# Patient Record
Sex: FEMALE | Race: WHITE | Employment: OTHER | URBAN - METROPOLITAN AREA
[De-identification: names, ages, dates, MRNs, and addresses within clinical notes are randomized per-mention and may not be internally consistent; named-entity substitution may affect disease eponyms.]

---

## 2019-09-13 ENCOUNTER — HOSPITAL ENCOUNTER (OUTPATIENT)
Dept: MAMMOGRAPHY | Age: 64
Discharge: HOME | End: 2019-09-13
Attending: OBSTETRICS & GYNECOLOGY
Payer: COMMERCIAL

## 2019-09-13 DIAGNOSIS — N63.20 LEFT BREAST MASS: ICD-10-CM

## 2019-09-13 PROCEDURE — 77066 DX MAMMO INCL CAD BI: CPT

## 2019-09-13 PROCEDURE — 76642 ULTRASOUND BREAST LIMITED: CPT

## 2024-08-21 ENCOUNTER — OFFICE VISIT (OUTPATIENT)
Dept: OBGYN CLINIC | Age: 69
End: 2024-08-21
Payer: COMMERCIAL

## 2024-08-21 VITALS — BODY MASS INDEX: 28.2 KG/M2 | DIASTOLIC BLOOD PRESSURE: 60 MMHG | SYSTOLIC BLOOD PRESSURE: 90 MMHG | WEIGHT: 163 LBS

## 2024-08-21 DIAGNOSIS — R10.2 PELVIC PRESSURE IN FEMALE: ICD-10-CM

## 2024-08-21 DIAGNOSIS — R10.2 PELVIC PAIN: Primary | ICD-10-CM

## 2024-08-21 PROCEDURE — 1123F ACP DISCUSS/DSCN MKR DOCD: CPT | Performed by: OBSTETRICS & GYNECOLOGY

## 2024-08-21 PROCEDURE — 99213 OFFICE O/P EST LOW 20 MIN: CPT | Performed by: OBSTETRICS & GYNECOLOGY

## 2024-08-21 ASSESSMENT — ENCOUNTER SYMPTOMS
EYES NEGATIVE: 1
ALLERGIC/IMMUNOLOGIC NEGATIVE: 1
GASTROINTESTINAL NEGATIVE: 1
RESPIRATORY NEGATIVE: 1

## 2024-08-21 NOTE — PROGRESS NOTES
Name: Kamryn Koroma  Date: 2024  YOB: 1955  LMP: No LMP recorded.      Kamryn is a 68 y.o.   who is here for a problem visit for pelic pain  and feeling pressure in her vagina.  She has seen the urogyn at [facility] and did not like the pessary.    Kamryn  has no history on file for sexual activity.  Contraception: none.   Menstrual status: none  Gyn Surgery: s/p cystectomy     Women's Health Timeline:  No specialty comments available.      Pap History:  Diagnosis:   Date Value Ref Range Status   2022 Comment  Final     Comment:     NEGATIVE FOR INTRAEPITHELIAL LESION OR MALIGNANCY.  CELLULAR CHANGES ASSOCIATED WITH ATROPHY ARE PRESENT.     2020 Comment  Final     Comment:     NEGATIVE FOR INTRAEPITHELIAL LESION OR MALIGNANCY.   08/15/2018 Comment  Final     Comment:     NEGATIVE FOR INTRAEPITHELIAL LESION AND MALIGNANCY.        OB History:  OB History          3    Para        Term   3            AB        Living             SAB        IAB        Ectopic        Molar        Multiple        Live Births                     Medical History:  Past Medical History:  No date: Abnormal Papanicolaou smear of cervix  No date: Bipolar disorder ([facility])  No date: Chronic knee pain  No date: Cystocele  No date: Diabetic peripheral neuropathy associated with type 2   diabetes mellitus ([facility])  No date: Dyslipidemia  No date: Gastroesophageal reflux disease  No date: Genital herpes  No date: Hypertension  No date: Irritable bowel syndrome  No date: Rectocele     Surgical History:  Past Surgical History:  No date: ABDOMINOPLASTY  : BREAST BIOPSY      Comment:  benign per pt  No date:  SECTION      Comment:  x 1  No date: HX CYSTECTOMY      Comment:  right foot/nail  1998: LEEP  No date: TONSILLECTOMY     Meds:    Current Outpatient Medications:     acetaminophen (TYLENOL) 500 MG tablet, Take by mouth every 6 hours as needed, Disp: , Rfl:     albuterol sulfate  (90 Base)

## 2024-08-29 ENCOUNTER — OFFICE VISIT (OUTPATIENT)
Dept: OBGYN CLINIC | Age: 69
End: 2024-08-29
Payer: COMMERCIAL

## 2024-08-29 VITALS
BODY MASS INDEX: 28 KG/M2 | SYSTOLIC BLOOD PRESSURE: 114 MMHG | DIASTOLIC BLOOD PRESSURE: 72 MMHG | HEIGHT: 64 IN | WEIGHT: 164 LBS

## 2024-08-29 DIAGNOSIS — R10.32 LLQ PAIN: Primary | ICD-10-CM

## 2024-08-29 PROCEDURE — 99213 OFFICE O/P EST LOW 20 MIN: CPT | Performed by: OBSTETRICS & GYNECOLOGY

## 2024-08-29 PROCEDURE — 1123F ACP DISCUSS/DSCN MKR DOCD: CPT | Performed by: OBSTETRICS & GYNECOLOGY

## 2024-08-29 SDOH — ECONOMIC STABILITY: INCOME INSECURITY: HOW HARD IS IT FOR YOU TO PAY FOR THE VERY BASICS LIKE FOOD, HOUSING, MEDICAL CARE, AND HEATING?: NOT HARD AT ALL

## 2024-08-29 SDOH — ECONOMIC STABILITY: FOOD INSECURITY: WITHIN THE PAST 12 MONTHS, YOU WORRIED THAT YOUR FOOD WOULD RUN OUT BEFORE YOU GOT MONEY TO BUY MORE.: NEVER TRUE

## 2024-08-29 SDOH — ECONOMIC STABILITY: FOOD INSECURITY: WITHIN THE PAST 12 MONTHS, THE FOOD YOU BOUGHT JUST DIDN'T LAST AND YOU DIDN'T HAVE MONEY TO GET MORE.: NEVER TRUE

## 2024-08-29 ASSESSMENT — ENCOUNTER SYMPTOMS
ALLERGIC/IMMUNOLOGIC NEGATIVE: 1
EYES NEGATIVE: 1
RESPIRATORY NEGATIVE: 1
GASTROINTESTINAL NEGATIVE: 1

## 2024-08-29 NOTE — PROGRESS NOTES
Name: Kamryn Koroma  Date: 2024  YOB: 1955  LMP: No LMP recorded.      Kamryn is a 68 y.o.   who is here for a us due to LLQ pain.  She has a appt scheduled with Daya in several weeks for prolapse.     Kamryn  reports that she is not currently sexually active.  Contraception: post menopausal status.   Menstrual status: none  Gyn Surgery: s/p cystectomy     Women's Health Timeline:  No specialty comments available.      Pap History:  Diagnosis:   Date Value Ref Range Status   2022 Comment  Final     Comment:     NEGATIVE FOR INTRAEPITHELIAL LESION OR MALIGNANCY.  CELLULAR CHANGES ASSOCIATED WITH ATROPHY ARE PRESENT.     2020 Comment  Final     Comment:     NEGATIVE FOR INTRAEPITHELIAL LESION OR MALIGNANCY.   08/15/2018 Comment  Final     Comment:     NEGATIVE FOR INTRAEPITHELIAL LESION AND MALIGNANCY.        OB History:  OB History          3    Para        Term   3            AB        Living             SAB        IAB        Ectopic        Molar        Multiple        Live Births                     Medical History:  Past Medical History:  No date: Abnormal Papanicolaou smear of cervix  No date: Bipolar disorder ([facility])  No date: Chronic knee pain  No date: Cystocele  No date: Diabetic peripheral neuropathy associated with type 2   diabetes mellitus ([facility])  No date: Dyslipidemia  No date: Gastroesophageal reflux disease  No date: Genital herpes  No date: Hypertension  No date: Irritable bowel syndrome  No date: Rectocele     Surgical History:  Past Surgical History:  No date: ABDOMINOPLASTY  : BREAST BIOPSY      Comment:  benign per pt  No date:  SECTION      Comment:  x 1  No date: HX CYSTECTOMY      Comment:  right foot/nail  1998: LEEP  No date: TONSILLECTOMY     Meds:    Current Outpatient Medications:     acetaminophen (TYLENOL) 500 MG tablet, Take by mouth every 6 hours as needed, Disp: , Rfl:     albuterol sulfate  (90 Base) MCG/ACT inhaler,

## 2024-10-03 ENCOUNTER — OFFICE VISIT (OUTPATIENT)
Dept: UROGYNECOLOGY | Age: 69
End: 2024-10-03
Payer: COMMERCIAL

## 2024-10-03 VITALS — WEIGHT: 160 LBS | BODY MASS INDEX: 28.35 KG/M2 | HEIGHT: 63 IN

## 2024-10-03 DIAGNOSIS — M62.89 HIGH-TONE PELVIC FLOOR DYSFUNCTION: Primary | ICD-10-CM

## 2024-10-03 LAB
BILIRUBIN, URINE, POC: NEGATIVE
BLOOD URINE, POC: NEGATIVE
GLUCOSE URINE, POC: 100
KETONES, URINE, POC: NEGATIVE
LEUKOCYTE ESTERASE, URINE, POC: NEGATIVE
NITRITE, URINE, POC: NEGATIVE
PH, URINE, POC: 5.5 (ref 4.6–8)
PROTEIN,URINE, POC: NEGATIVE
SPECIFIC GRAVITY, URINE, POC: 1 (ref 1–1.03)
URINALYSIS CLARITY, POC: CLEAR
URINALYSIS COLOR, POC: YELLOW
UROBILINOGEN, POC: NORMAL MG/DL

## 2024-10-03 PROCEDURE — 81002 URINALYSIS NONAUTO W/O SCOPE: CPT | Performed by: OBSTETRICS & GYNECOLOGY

## 2024-10-03 PROCEDURE — 1123F ACP DISCUSS/DSCN MKR DOCD: CPT | Performed by: OBSTETRICS & GYNECOLOGY

## 2024-10-03 PROCEDURE — 99204 OFFICE O/P NEW MOD 45 MIN: CPT | Performed by: OBSTETRICS & GYNECOLOGY

## 2024-10-03 PROCEDURE — 99459 PELVIC EXAMINATION: CPT | Performed by: OBSTETRICS & GYNECOLOGY

## 2024-10-03 NOTE — ASSESSMENT & PLAN NOTE
High tone pelvic floor- she has tenderness throughout. Left > Right. She has a lot of weakness of the muscles as well.     We discussed the purpose of physical therapy which is to strengthen the pelvic floor muscles and teach proper coordination of those muscles. I described the anatomy of those muscles involved and their relationship to the end-organs in the pelvis. I described therapy techniques which include a combination of therapeutic exercise, biofeedback, neuromuscular re-education, home programs, and electrical stimulation, as well as therapeutic massage and ultrasound for pain.    I recommend Physical therapy and will send a referral.

## 2024-10-03 NOTE — PATIENT INSTRUCTIONS
Pelvic Floor Therapy List:    Locations within [facility]    Scheduling phone number: [phone]    [facility]       [address]  [place] [address]    [facility]  [address]  [place] [address]    [facility]  [address]  [place] [address]    [facility]  [address] [address]      Locations Outside of [facility]    [facility]- (Janeen Guidry & Summer Ramirez)  [address]  [place] [address]  Phone: [phone]  Fax [phone]    [facility] -(Rowan Duncan)  [address] [address]  Phone: [phone]  Fax: [phone]    [facility]  [address] [place] [address]  Phone: [phone]  Fax: [phone]    [facility] (Audrey Turner)  [address]  [place] [address]  Phone: [phone]  Fax: [phone]    [facility] (Kate Nuñez)  [address] [address]  Phone: [phone]  Fax: [phone]    [facility] (Dr. Ashley Coronado and Dr. Audrey Pineda)  [address] [place] [address]   Phone: [phone]  Fax: [phone]  &  [address] [address]    [facility] (Kenisha Rodrigues)  [address]  [place] [address]  Phone: [phone]  Fax: [phone]    [facility] [place]- (Sandrine Waldron)  [address]  [facility] [address]  Phone: [phone]  Fax: [phone]    [facility] (Evi Stokes)  [address] [place] [address]  [phone]  Fax [phone]  [facility]- (Alex Capps)  [address]   [place] [address]  Phone: [phone]  Fax: [phone]    [facility] (Summer De Jesus)  [address]  [place] [address]  Phone: [phone]  Fax: [phone]

## 2024-10-03 NOTE — PROGRESS NOTES
were reviewed from prior tests:     No results found for this visit on 10/03/24.    Ht 1.6 m (5' 3\")   Wt 72.6 kg (160 lb)   BMI 28.34 kg/m²     Physical Exam  Vitals reviewed. Exam conducted with a chaperone present.   Constitutional:       General: She is not in acute distress.     Appearance: Normal appearance.   HENT:      Head: Normocephalic and atraumatic.   Cardiovascular:      Heart sounds: Normal heart sounds.   Pulmonary:      Effort: Pulmonary effort is normal. No respiratory distress.   Abdominal:      General: There is no distension.      Palpations: There is no mass.      Tenderness: There is no abdominal tenderness. There is no guarding or rebound.      Hernia: No hernia is present.   Musculoskeletal:      Cervical back: Normal range of motion.   Skin:     General: Skin is warm and dry.   Neurological:      Mental Status: She is alert and oriented to person, place, and time.   Psychiatric:         Mood and Affect: Mood normal.         Behavior: Behavior normal.         Thought Content: Thought content normal.         Judgment: Judgment normal.          Female Genitourinary This includes at least 4 minutes of clinical staff time associated with chaperoning a pelvic exam.  Vulva:    Normal. No lesions  Bartholin's Gland:  Bilateral , Normal, nontender  Skenes Gland:  Bilateral, Normal, nontender   Clitoris:  Normal.   Introitus:    Normal.   Urethral Meatus:  Normal appearing, normal size, no lesions, no prolapse  Urethra:  No masses, no tenderness  Vagina:  No atrophy, no discharge, no lesions  Cervix:  No lesions, no discharge  Uterus:  No tenderness, normal mobility   Adnexa:   No masses palpated, no tenderness  Bladder:  No tenderness, no masses palpated  Perineum:  Normal, no lesions    Rectal   Anorectal Exam: No hemorrhoids and no masses or lesions of the perineum    POP-Q: (Pelvic Organ Prolapse - Quantification Exam):      10/3/2024     1:00 PM   Pelvic Organ Prolapse Quantification

## 2024-10-06 LAB
BACTERIA SPEC CULT: NORMAL
SERVICE CMNT-IMP: NORMAL

## 2024-10-14 ENCOUNTER — PREP FOR PROCEDURE (OUTPATIENT)
Age: 69
End: 2024-10-14

## 2024-10-14 ENCOUNTER — OFFICE VISIT (OUTPATIENT)
Age: 69
End: 2024-10-14
Payer: COMMERCIAL

## 2024-10-14 ENCOUNTER — TELEPHONE (OUTPATIENT)
Age: 69
End: 2024-10-14

## 2024-10-14 VITALS
OXYGEN SATURATION: 98 % | HEIGHT: 63 IN | HEART RATE: 87 BPM | WEIGHT: 162 LBS | DIASTOLIC BLOOD PRESSURE: 58 MMHG | SYSTOLIC BLOOD PRESSURE: 101 MMHG | BODY MASS INDEX: 28.7 KG/M2 | RESPIRATION RATE: 16 BRPM

## 2024-10-14 DIAGNOSIS — R19.8 ALTERNATING CONSTIPATION AND DIARRHEA: ICD-10-CM

## 2024-10-14 DIAGNOSIS — R10.32 LEFT LOWER QUADRANT PAIN: Primary | ICD-10-CM

## 2024-10-14 DIAGNOSIS — R10.32 LEFT LOWER QUADRANT PAIN: ICD-10-CM

## 2024-10-14 PROCEDURE — 1123F ACP DISCUSS/DSCN MKR DOCD: CPT | Performed by: PHYSICIAN ASSISTANT

## 2024-10-14 PROCEDURE — 99204 OFFICE O/P NEW MOD 45 MIN: CPT | Performed by: PHYSICIAN ASSISTANT

## 2024-10-14 RX ORDER — DICYCLOMINE HYDROCHLORIDE 10 MG/1
10 CAPSULE ORAL 2 TIMES DAILY
Qty: 180 CAPSULE | Refills: 0 | Status: SHIPPED | OUTPATIENT
Start: 2024-10-14

## 2024-10-14 NOTE — TELEPHONE ENCOUNTER
Patient in office, scheduled for colonoscopy with Dr. Barajas 10/22/2024 at [facility]. Miralax prep instructions given to the patient in office. Faxed cardiac clearance and Plavix hold request to Dr. Davenport/[facility] [phone].         Items to purchase at pharmacy 5 days before your procedure:  Dulcolax tablets-- (LAXATIVE ONLY- not stool softener)  238-gram bottle of MiraLAX  64 ounces Gatorade, Crystal Light, Apple juice (NO RED BLUE OR PURPLE IN COLOR)    The day before your procedure:  Clear liquids only the entire day (water, coffee, tea, Sprite, 7-up, Jell-O, popsicles, Chicken/beef broth, apple juice, Gatorade) NO MILK, CREAMER, OR DAIRY PRODUCTS. NO SOLID FOODS. NOTHING RED, BLUE, OR PURPLE IN COLOR.  Mix Gatorade and MiraLAX together, shake the solution until the MiraLAX is dissolved. Chill before drinking.   Start at 5:00 pm Start drinking MiraLAX solution. Drink 8-ounce glass every 30 minutes. Until half gone. Put remainder in refrigerator.  At 5:00 pm take 2x Dulcolax tablets with 16 oz of water    NO MORE LIQUIDS AFTER MIDNIGHT (other than 2nd part of MiraLAX solution)    The day of your procedure:     6 HOURS PRIOR TO PROCEDURE: Drink the other half of your MiraLAX solution until gone.     Once you finish your prep, no more liquids until after procedure.     IMPORTANT:     If you do not follow these instructions, your colonoscopy could be canceled due to having an unclean prep.

## 2024-10-14 NOTE — PROGRESS NOTES
valACYclovir (VALTREX) 1 g tablet Take 1 tablet by mouth 2 times daily as needed      carvedilol (COREG) 3.125 MG tablet Take 1 tablet by mouth 2 times daily      naproxen (NAPROSYN) 500 MG tablet TAKE 1 TABLET BY MOUTH TWICE DAILY AS NEEDED (Patient not taking: Reported on 10/3/2024)       No current facility-administered medications for this visit.       Review of Systems  All other systems reviewed and are negative except as noted above.          Objective     Vitals:    10/14/24 1104   BP: (!) 101/58   Pulse: 87   Resp: 16   SpO2: 98%       Physical Exam  Vitals reviewed.   Constitutional:       General: She is not in acute distress.     Appearance: She is not ill-appearing, toxic-appearing or diaphoretic.   Eyes:      General: No scleral icterus.  Cardiovascular:      Rate and Rhythm: Normal rate and regular rhythm.      Heart sounds: No murmur heard.  Pulmonary:      Effort: Pulmonary effort is normal. No respiratory distress.   Abdominal:      General: There is no distension.      Palpations: Abdomen is soft.      Tenderness: There is abdominal tenderness (mild discomfort across the lower abdomen). There is no guarding or rebound.      Comments: Protuberant abdomen   Skin:     General: Skin is warm and dry.      Coloration: Skin is not jaundiced.   Neurological:      General: No focal deficit present.      Mental Status: She is alert and oriented to person, place, and time.   Psychiatric:         Mood and Affect: Mood normal.         Behavior: Behavior normal.         Thought Content: Thought content normal.         Judgment: Judgment normal.              Return for scheduled colonoscopy, follow-up visit 1-2 weeks after procedure.            An electronic signature was used to authenticate this note.    --Melissa R Grinnell, PA-C

## 2024-10-14 NOTE — PATIENT INSTRUCTIONS
For diarrhea:   Take Imodium as needed for diarrhea. If you are frequently having urgent diarrhea following meals, schedule Imodium dose 30 minutes prior to meals. Be sure to stay well hydrated. Avoid alcohol as well as food and beverages high in fat, sugar, artificial sweeteners or stimulants such as nicotine or caffeine. Increase daily fiber intake to 25-35 grams daily either by dietary intake or an over-the-counter psyllium husk fiber supplement. Keep a diet journal to evaluate for any food triggers. Can try OTC IB-ross (encapsulated peppermint oil) for abdominal pain or discomfort related to IBS.

## 2024-10-15 RX ORDER — SODIUM CHLORIDE 0.9 % (FLUSH) 0.9 %
5-40 SYRINGE (ML) INJECTION EVERY 12 HOURS SCHEDULED
Status: CANCELLED | OUTPATIENT
Start: 2024-10-15

## 2024-10-15 RX ORDER — SODIUM CHLORIDE 9 MG/ML
25 INJECTION, SOLUTION INTRAVENOUS PRN
Status: CANCELLED | OUTPATIENT
Start: 2024-10-15

## 2024-10-15 RX ORDER — SODIUM CHLORIDE 0.9 % (FLUSH) 0.9 %
5-40 SYRINGE (ML) INJECTION PRN
Status: CANCELLED | OUTPATIENT
Start: 2024-10-15

## 2024-10-16 RX ORDER — LAMOTRIGINE 25 MG/1
25 TABLET ORAL DAILY
COMMUNITY

## 2024-10-16 RX ORDER — ZIPRASIDONE HYDROCHLORIDE 20 MG/1
20 CAPSULE ORAL 2 TIMES DAILY WITH MEALS
COMMUNITY

## 2024-10-16 RX ORDER — POTASSIUM CHLORIDE 750 MG/1
40 TABLET, EXTENDED RELEASE ORAL 2 TIMES DAILY
COMMUNITY

## 2024-10-16 RX ORDER — BENZTROPINE MESYLATE 1 MG/1
2 TABLET ORAL 2 TIMES DAILY
COMMUNITY

## 2024-10-16 RX ORDER — NITROGLYCERIN 0.4 MG/1
0.4 TABLET SUBLINGUAL EVERY 5 MIN PRN
COMMUNITY

## 2024-10-16 RX ORDER — CHLORHEXIDINE GLUCONATE 4 %
1 LIQUID (ML) TOPICAL NIGHTLY
COMMUNITY

## 2024-10-16 RX ORDER — CLOPIDOGREL BISULFATE 75 MG/1
75 TABLET ORAL DAILY
COMMUNITY

## 2024-10-16 RX ORDER — FLUTICASONE PROPIONATE 50 MCG
1 SPRAY, SUSPENSION (ML) NASAL DAILY PRN
COMMUNITY

## 2024-10-16 NOTE — PERIOP NOTE
Patient verified name, , and procedure.    Type: 1a; abbreviated assessment per anesthesia guidelines    Labs per anesthesia: None    Instructed pt that they will be notified the day before their procedure by the GI Lab for time of arrival if their procedure is [place] and Pre-op for [place] cases. Arrival times should be called by 5 pm. If no phone is received the patient should contact their respective hospital. The GI lab telephone number is [phone] and ES Pre-op is [phone].     Follow diet and prep instructions per office including NPO status with exception of Please drink 32 ounces of non-caffeinated clear liquids 2 hours prior to your arrival to avoid dehydration.       Bath or shower the night before and the am of surgery with non-moisturizing soap. No lotions, oils, powders, cologne on skin. No make up, eye make up or jewelry. Wear loose fitting comfortable, clean clothing.     Must have adult present in building the entire time .     Medications for the day of procedure amlodipine, benztropine, carvedilol, gabapentin, lamotrigine, Omeprazole, Ziprasidone and night prior to procedure Toujeo 46 units.     Please hold all vitamins x 7 days prior to procedure and NSAIDS (Aspirin, Excedrin, Goody powders, Motrin, Ibuprofen, Advil, Aleve and Naproxen) x 5 days prior to procedure. Should you have a procedure date that does not allow for the amount of time instructed above, please stop taking vitamins, supplements, and NSAIDS IMMEDIATELY.        The following discharge instructions reviewed with patient: medication given during procedure may cause drowsiness for several hours, therefore, do not drive or operate machinery for remainder of the day. You may not drink alcohol on the day of your procedure, please resume regular diet and activity unless otherwise directed. You may experience abdominal distention for several hours that is relieved by the passage of gas. Contact your physician if you have any of the

## 2024-10-21 NOTE — PROGRESS NOTES
RN called pt to inform them of an opening and an earlier appointment.  Appointment time of 1200 with arrival at 1030.  Pt verbalized understanding.

## 2024-10-21 NOTE — PROGRESS NOTES
RN called Pt to confirm appointment time of 1330, arrival time 1200, location,  requirement, and instructions for registration at the hospital.  Pt verbalized understanding.

## 2024-10-22 ENCOUNTER — ANESTHESIA (OUTPATIENT)
Dept: ENDOSCOPY | Age: 69
End: 2024-10-22
Payer: COMMERCIAL

## 2024-10-22 ENCOUNTER — TELEPHONE (OUTPATIENT)
Dept: GASTROENTEROLOGY | Age: 69
End: 2024-10-22

## 2024-10-22 ENCOUNTER — HOSPITAL ENCOUNTER (OUTPATIENT)
Age: 69
Discharge: HOME | End: 2024-10-22
Attending: STUDENT IN AN ORGANIZED HEALTH CARE EDUCATION/TRAINING PROGRAM | Admitting: STUDENT IN AN ORGANIZED HEALTH CARE EDUCATION/TRAINING PROGRAM
Payer: COMMERCIAL

## 2024-10-22 ENCOUNTER — ANESTHESIA EVENT (OUTPATIENT)
Dept: ENDOSCOPY | Age: 69
End: 2024-10-22
Payer: COMMERCIAL

## 2024-10-22 VITALS
RESPIRATION RATE: 16 BRPM | OXYGEN SATURATION: 97 % | SYSTOLIC BLOOD PRESSURE: 131 MMHG | BODY MASS INDEX: 28.7 KG/M2 | DIASTOLIC BLOOD PRESSURE: 65 MMHG | TEMPERATURE: 98 F | HEIGHT: 63 IN | HEART RATE: 73 BPM | WEIGHT: 162 LBS

## 2024-10-22 PROBLEM — Z12.11 ENCOUNTER FOR SCREENING FOR MALIGNANT NEOPLASM OF COLON: Status: ACTIVE | Noted: 2024-10-22

## 2024-10-22 LAB
GLUCOSE BLD STRIP.AUTO-MCNC: 167 MG/DL (ref 65–100)
GLUCOSE BLD STRIP.AUTO-MCNC: 216 MG/DL (ref 65–100)
SERVICE CMNT-IMP: ABNORMAL
SERVICE CMNT-IMP: ABNORMAL

## 2024-10-22 PROCEDURE — 7100000010 HC PHASE II RECOVERY - FIRST 15 MIN: Performed by: STUDENT IN AN ORGANIZED HEALTH CARE EDUCATION/TRAINING PROGRAM

## 2024-10-22 PROCEDURE — 2580000003 HC RX 258: Performed by: NURSE ANESTHETIST, CERTIFIED REGISTERED

## 2024-10-22 PROCEDURE — 6360000002 HC RX W HCPCS: Performed by: NURSE ANESTHETIST, CERTIFIED REGISTERED

## 2024-10-22 PROCEDURE — 88305 TISSUE EXAM BY PATHOLOGIST: CPT

## 2024-10-22 PROCEDURE — 82962 GLUCOSE BLOOD TEST: CPT

## 2024-10-22 PROCEDURE — 2709999900 HC NON-CHARGEABLE SUPPLY: Performed by: STUDENT IN AN ORGANIZED HEALTH CARE EDUCATION/TRAINING PROGRAM

## 2024-10-22 PROCEDURE — 7100000011 HC PHASE II RECOVERY - ADDTL 15 MIN: Performed by: STUDENT IN AN ORGANIZED HEALTH CARE EDUCATION/TRAINING PROGRAM

## 2024-10-22 PROCEDURE — 3700000001 HC ADD 15 MINUTES (ANESTHESIA): Performed by: STUDENT IN AN ORGANIZED HEALTH CARE EDUCATION/TRAINING PROGRAM

## 2024-10-22 PROCEDURE — 3609010300 HC COLONOSCOPY W/BIOPSY SINGLE/MULTIPLE: Performed by: STUDENT IN AN ORGANIZED HEALTH CARE EDUCATION/TRAINING PROGRAM

## 2024-10-22 PROCEDURE — 3700000000 HC ANESTHESIA ATTENDED CARE: Performed by: STUDENT IN AN ORGANIZED HEALTH CARE EDUCATION/TRAINING PROGRAM

## 2024-10-22 RX ORDER — SODIUM CHLORIDE 0.9 % (FLUSH) 0.9 %
5-40 SYRINGE (ML) INJECTION EVERY 12 HOURS SCHEDULED
Status: DISCONTINUED | OUTPATIENT
Start: 2024-10-22 | End: 2024-10-22 | Stop reason: HOSPADM

## 2024-10-22 RX ORDER — SODIUM CHLORIDE 9 MG/ML
25 INJECTION, SOLUTION INTRAVENOUS PRN
Status: DISCONTINUED | OUTPATIENT
Start: 2024-10-22 | End: 2024-10-22 | Stop reason: HOSPADM

## 2024-10-22 RX ORDER — SODIUM CHLORIDE 0.9 % (FLUSH) 0.9 %
SYRINGE (ML) INJECTION
Status: DISCONTINUED | OUTPATIENT
Start: 2024-10-22 | End: 2024-10-22 | Stop reason: SDUPTHER

## 2024-10-22 RX ORDER — PROPOFOL 10 MG/ML
INJECTION, EMULSION INTRAVENOUS
Status: DISCONTINUED | OUTPATIENT
Start: 2024-10-22 | End: 2024-10-22 | Stop reason: SDUPTHER

## 2024-10-22 RX ORDER — SODIUM CHLORIDE 0.9 % (FLUSH) 0.9 %
5-40 SYRINGE (ML) INJECTION PRN
Status: DISCONTINUED | OUTPATIENT
Start: 2024-10-22 | End: 2024-10-22 | Stop reason: HOSPADM

## 2024-10-22 RX ADMIN — PROPOFOL 50 MG: 10 INJECTION, EMULSION INTRAVENOUS at 12:15

## 2024-10-22 RX ADMIN — PROPOFOL 150 MCG/KG/MIN: 10 INJECTION, EMULSION INTRAVENOUS at 12:16

## 2024-10-22 RX ADMIN — PROPOFOL 50 MG: 10 INJECTION, EMULSION INTRAVENOUS at 12:19

## 2024-10-22 RX ADMIN — SODIUM CHLORIDE, PRESERVATIVE FREE 10 ML: 5 INJECTION INTRAVENOUS at 12:17

## 2024-10-22 ASSESSMENT — PAIN - FUNCTIONAL ASSESSMENT
PAIN_FUNCTIONAL_ASSESSMENT: FACE, LEGS, ACTIVITY, CRY, AND CONSOLABILITY (FLACC)
PAIN_FUNCTIONAL_ASSESSMENT: 0-10
PAIN_FUNCTIONAL_ASSESSMENT: NONE - DENIES PAIN

## 2024-10-22 NOTE — ANESTHESIA PRE PROCEDURE
Department of Anesthesiology  Preprocedure Note       Name:  Kamryn Koroma   Age:  68 y.o.  :  1955                                          MRN:  [id number]         Date:  10/22/2024      Surgeon: Surgeon(s):  Elaina Barajas MD    Procedure: Procedure(s):  COLORECTAL CANCER SCREENING, NOT HIGH RISK    Medications prior to admission:   Prior to Admission medications    Medication Sig Start Date End Date Taking? Authorizing Provider   Multiple Vitamins-Minerals (MULTIVITAMIN WOMENS 50+ ADV) TABS Take 1 tablet by mouth at bedtime   Yes ProviderSeun MD   potassium chloride (KLOR-CON M) 10 MEQ extended release tablet Take 4 tablets by mouth 2 times daily   Yes Provider, MD Seun   empagliflozin (JARDIANCE) 25 MG tablet Take 1 tablet by mouth daily   Yes ProviderSeun MD   benztropine (COGENTIN) 1 MG tablet Take 2 tablets by mouth 2 times daily   Yes ProviderSeun MD   clopidogrel (PLAVIX) 75 MG tablet Take 1 tablet by mouth daily   Yes ProviderSeun MD   lamoTRIgine (LAMICTAL) 25 MG tablet Take 1 tablet by mouth daily Indications: 1 tab in am and take 2 tablet at night   Yes Provider, MD Seun   ziprasidone (GEODON) 20 MG capsule Take 1 capsule by mouth 2 times daily (with meals)   Yes ProviderSeun MD   fluticasone (FLONASE) 50 MCG/ACT nasal spray 1 spray by Each Nostril route daily as needed for Rhinitis   Yes Provider, MD Seun   dicyclomine (BENTYL) 10 MG capsule Take 1 capsule by mouth in the morning and at bedtime 10/14/24  Yes Grinnell, Melissa R, PA-C   acetaminophen (TYLENOL) 500 MG tablet Take by mouth every 6 hours as needed   Yes Automatic Reconciliation, Ar   albuterol sulfate  (90 Base) MCG/ACT inhaler Inhale 1 puff into the lungs every 4 hours as needed   Yes Automatic Reconciliation, Ar   amLODIPine (NORVASC) 5 MG tablet Take 1 tablet by mouth daily 20  Yes Automatic Reconciliation, Ar   aspirin 81 MG EC tablet Take 1

## 2024-10-22 NOTE — ANESTHESIA POSTPROCEDURE EVALUATION
Department of Anesthesiology  Postprocedure Note    Patient: Kamryn Koroma  MRN: [id number]  YOB: 1955  Date of evaluation: 10/22/2024    Procedure Summary       Date: 10/22/24 Room / Location: [place] [address] / [facility] ENDOSCOPY    Anesthesia Start: 1210 Anesthesia Stop: 1236    Procedure: COLONOSCOPY BIOPSY Diagnosis:       Left lower quadrant pain      Alternating constipation and diarrhea      (Left lower quadrant pain [R10.32])      (Alternating constipation and diarrhea [R19.8])    Surgeons: Elaina Barajas MD Responsible Provider: Johnny Gifford MD    Anesthesia Type: TIVA ASA Status: 3            Anesthesia Type: No value filed.    Iris Phase I: Iris Score: 10    Iris Phase II: Iris Score: 10    Anesthesia Post Evaluation    Patient location during evaluation: bedside  Patient participation: complete - patient participated  Level of consciousness: awake and alert  Airway patency: patent  Nausea & Vomiting: no vomiting  Cardiovascular status: hemodynamically stable  Respiratory status: acceptable  Hydration status: euvolemic  Pain management: adequate    No notable events documented.

## 2024-10-22 NOTE — H&P
leukemia    Diabetes Brother      Diabetes Maternal Grandmother      Diabetes Paternal Grandmother      Diabetes Paternal Grandfather      Diabetes Maternal Grandfather      Breast Cancer Neg Hx      Thyroid Disease Neg Hx      Ovarian Cancer Neg Hx      Colon Cancer Neg Hx              Current Facility-Administered Medications          Current Outpatient Medications   Medication Sig Dispense Refill    dicyclomine (BENTYL) 10 MG capsule Take 1 capsule by mouth in the morning and at bedtime 180 capsule 0    acetaminophen (TYLENOL) 500 MG tablet Take by mouth every 6 hours as needed        albuterol sulfate  (90 Base) MCG/ACT inhaler Inhale into the lungs        amLODIPine (NORVASC) 2.5 MG tablet Take 1 tablet by mouth daily        aspirin 81 MG EC tablet Take 1 tablet by mouth daily        atorvastatin (LIPITOR) 40 MG tablet Take 1 tablet by mouth        canagliflozin (INVOKANA) 300 MG TABS tablet Take by mouth every morning (before breakfast)        divalproex (DEPAKOTE) 500 MG DR tablet Take 1 tablet by mouth        docusate (COLACE, DULCOLAX) 100 MG CAPS Take 100 mg by mouth 2 times daily        fexofenadine (ALLEGRA) 180 MG tablet Take by mouth        fluticasone propionate (FLOVENT) 50 MCG/BLIST AEPB inhaler Inhale into the lungs        furosemide (LASIX) 40 MG tablet TAKE 1 TABLET BY MOUTH EVERY MORNING AND 1/2 TABLET EVERY EVENING AS NEEDED        gabapentin (NEURONTIN) 300 MG capsule Take 1 capsule by mouth 2 times daily.        hydrOXYzine (VISTARIL) 25 MG capsule Take 1 capsule by mouth 2 times daily        Insulin Glargine, 1 Unit Dial, (TOUJEO SOLOSTAR) 300 UNIT/ML SOPN Inject 34 Units into the skin daily        losartan (COZAAR) 100 MG tablet Take 1 tablet by mouth daily        metFORMIN (GLUCOPHAGE) 1000 MG tablet Take 1 tablet by mouth 2 times daily (with meals)        nystatin (MYCOSTATIN) 922209 UNIT/GM ointment Apply topically 2 times daily        omeprazole (PRILOSEC) 20 MG delayed release

## 2024-10-22 NOTE — TELEPHONE ENCOUNTER
Called pt and left voicemail advising them to return call to schedule follow up office visit in 3-4 weeks with PA, Melissa Grinnell per Dr. Barajas's procedure note.

## 2024-10-22 NOTE — DISCHARGE INSTRUCTIONS
Gastrointestinal Colonoscopy/Flexible Sigmoidoscopy - Lower Exam Discharge Instructions    Call Dr. Barajas at [phone] for any problems or questions.    Contact the doctor’s office for follow up appointment as directed.    Medication may cause drowsiness for several hours, therefore:  Do not drive or operate machinery for reminder of the day.  No alcohol today.  Do not make any important or legal decisions for 24 hours.  Do not sign any legal documents for 24 hours.    Ordinarily, you may resume regular diet and activity after exam unless otherwise specified by your physician.    Because of air put into your colon during exam, you may experience some abdominal distension, relieved by the passage of gas, for several hours.    Contact your physician if you have any of the following:  Excessive amount of bleeding - large amount when having a bowel movement.  Occasional specks of blood with bowel movement would not be unusual.  Severe abdominal pain  Fever or Chills    Any additional instructions:  Resume previous diet. Continue present medications. Repeat colonoscopy in 10 years. We will arrange you a follow up visit with Evie in the office in 3-4 weeks. Await for Pathology letter in 1-2 weeks through [facility], if not signed up for [facility], we will mail the results to your address.

## 2024-11-21 PROBLEM — Z12.11 ENCOUNTER FOR SCREENING FOR MALIGNANT NEOPLASM OF COLON: Status: RESOLVED | Noted: 2024-10-22 | Resolved: 2024-11-21

## (undated) DEVICE — NEEDLE SYRINGE 18GA L1.5IN RED PLAS HUB S STL BLNT FILL W/O

## (undated) DEVICE — FORCEPS BX L240CM JAW DIA2.8MM L CAP W/ NDL MIC MESH TOOTH

## (undated) DEVICE — ENDOSCOPIC KIT 1.1+ OP4 CA DE 2 GWN AAMI LEVEL 3

## (undated) DEVICE — KENDALL RADIOLUCENT FOAM MONITORING ELECTRODE RECTANGULAR SHAPE: Brand: KENDALL

## (undated) DEVICE — CONNECTOR TBNG OD5-7MM O2 END DISP

## (undated) DEVICE — AIRLIFE™ OXYGEN TUBING 7 FEET (2.1 M) CRUSH RESISTANT OXYGEN TUBING, VINYL TIPPED: Brand: AIRLIFE™

## (undated) DEVICE — SINGLE PORT MANIFOLD: Brand: NEPTUNE 2

## (undated) DEVICE — SYRINGE MEDICAL 3ML CLEAR PLASTIC STANDARD NON CONTROL LUERLOCK TIP DISPOSABLE

## (undated) DEVICE — CONTAINER FORMALIN PREFILLED 10% NBF 60ML

## (undated) DEVICE — GAUZE,SPONGE,4"X4",12PLY,WOVEN,NS,LF: Brand: MEDLINE

## (undated) DEVICE — SYRINGE MED 10ML LUERLOCK TIP W/O SFTY DISP

## (undated) DEVICE — CANNULA NSL ORAL AD FOR CAPNOFLEX CO2 O2 AIRLFE

## (undated) DEVICE — LUBE JELLY FOIL PACK 1.4 OZ: Brand: MEDLINE INDUSTRIES, INC.